# Patient Record
Sex: FEMALE | Race: WHITE | HISPANIC OR LATINO | Employment: PART TIME | URBAN - METROPOLITAN AREA
[De-identification: names, ages, dates, MRNs, and addresses within clinical notes are randomized per-mention and may not be internally consistent; named-entity substitution may affect disease eponyms.]

---
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
## 2021-01-03 ENCOUNTER — APPOINTMENT (EMERGENCY)
Dept: RADIOLOGY | Facility: HOSPITAL | Age: 21
End: 2021-01-03
Payer: COMMERCIAL

## 2021-01-03 ENCOUNTER — HOSPITAL ENCOUNTER (EMERGENCY)
Facility: HOSPITAL | Age: 21
Discharge: HOME | End: 2021-01-03
Attending: EMERGENCY MEDICINE
Payer: COMMERCIAL

## 2021-01-03 VITALS
WEIGHT: 160 LBS | HEIGHT: 63 IN | BODY MASS INDEX: 28.35 KG/M2 | OXYGEN SATURATION: 99 % | HEART RATE: 107 BPM | TEMPERATURE: 102.9 F | SYSTOLIC BLOOD PRESSURE: 119 MMHG | RESPIRATION RATE: 20 BRPM | DIASTOLIC BLOOD PRESSURE: 69 MMHG

## 2021-01-03 DIAGNOSIS — R05.9 COUGH: ICD-10-CM

## 2021-01-03 DIAGNOSIS — R07.9 CHEST PAIN: Primary | ICD-10-CM

## 2021-01-03 LAB
ATRIAL RATE: 99 BPM
P AXIS: 33 DEGREES
PR INTERVAL: 132 MS
QRS AXIS: 99 DEGREES
QRSD INTERVAL: 88 MS
QT INTERVAL: 314 MS
QTC INTERVAL: 402 MS
T WAVE AXIS: 25 DEGREES
VENTRICULAR RATE: 99 BPM

## 2021-01-03 PROCEDURE — 71045 X-RAY EXAM CHEST 1 VIEW: CPT

## 2021-01-03 PROCEDURE — 93010 ELECTROCARDIOGRAM REPORT: CPT | Performed by: INTERNAL MEDICINE

## 2021-01-03 PROCEDURE — 99284 EMERGENCY DEPT VISIT MOD MDM: CPT

## 2021-01-03 PROCEDURE — 99285 EMERGENCY DEPT VISIT HI MDM: CPT | Performed by: EMERGENCY MEDICINE

## 2021-01-03 PROCEDURE — 96360 HYDRATION IV INFUSION INIT: CPT

## 2021-01-03 PROCEDURE — 93005 ELECTROCARDIOGRAM TRACING: CPT

## 2021-01-03 RX ADMIN — SODIUM CHLORIDE 1000 ML: 0.9 INJECTION, SOLUTION INTRAVENOUS at 06:43

## 2021-01-03 NOTE — ED PROVIDER NOTES
History  Chief Complaint   Patient presents with    Cough     per ems - patient dx with covid approx 1 week ago, c/o chest pain with coughing     18-year-old woman presenting with chest pain that occurs only when she coughs  She states that the symptoms of 3 days ago  Today someone and also bought a home pulse ox and it measured 89%  She called a nursing hotline in the instructor comes into the emergency department  She was diagnosed with COVID some time in the past 2 weeks, her symptoms 1st started on December 24th  They have been stable if not improving since then  The chest pain is sharp in nature, only occurs when she coughs  It does not radiate  She has nausea and vomiting, but these symptoms do not necessarily correlate to the chest pain  No lightheadedness  No family history of cardiac disease  No history of blood clot  Patient does not take any oral contraceptive pills  No hemoptysis, recent surgeries, trips, calf pain, calf swelling  Prior to Admission Medications   Prescriptions Last Dose Informant Patient Reported? Taking? cetirizine (ZyrTEC) 10 mg tablet   No No   Sig: Take 1 tablet (10 mg total) by mouth daily      Facility-Administered Medications: None       Past Medical History:   Diagnosis Date    Allergic rhinitis        History reviewed  No pertinent surgical history  History reviewed  No pertinent family history  I have reviewed and agree with the history as documented  E-Cigarette/Vaping     E-Cigarette/Vaping Substances     Social History     Tobacco Use    Smoking status: Never Smoker    Smokeless tobacco: Never Used   Substance Use Topics    Alcohol use: Not on file    Drug use: Not on file        Review of Systems   Constitutional: Positive for chills, fatigue and fever  Respiratory: Positive for cough  Negative for shortness of breath  Cardiovascular: Positive for chest pain  Negative for palpitations and leg swelling     Gastrointestinal: Positive for nausea and vomiting  Negative for abdominal pain and diarrhea  Genitourinary: Negative for decreased urine volume  Musculoskeletal: Negative for back pain  All other systems reviewed and are negative  Physical Exam  ED Triage Vitals [01/03/21 0550]   Temperature Pulse Respirations Blood Pressure SpO2   (!) 102 9 °F (39 4 °C) (!) 107 20 119/69 99 %      Temp Source Heart Rate Source Patient Position - Orthostatic VS BP Location FiO2 (%)   Oral Monitor -- -- --      Pain Score       4             Orthostatic Vital Signs  Vitals:    01/03/21 0550   BP: 119/69   Pulse: (!) 107       Physical Exam  Vitals signs reviewed  Constitutional:       Appearance: She is not ill-appearing  HENT:      Head: Normocephalic and atraumatic  Nose: Nose normal    Eyes:      General: No scleral icterus  Cardiovascular:      Rate and Rhythm: Regular rhythm  Tachycardia present  Pulses: Normal pulses  Radial pulses are 2+ on the right side and 2+ on the left side  Heart sounds: Normal heart sounds  No murmur  Pulmonary:      Effort: Pulmonary effort is normal       Breath sounds: Normal breath sounds  Abdominal:      General: There is no distension  Palpations: Abdomen is soft  Tenderness: There is no abdominal tenderness  Musculoskeletal:      Right lower leg: No edema  Left lower leg: No edema  Comments: No calf tenderness on exam     Skin:     General: Skin is warm and dry  Capillary Refill: Capillary refill takes less than 2 seconds  Coloration: Skin is not pale  Neurological:      General: No focal deficit present  Mental Status: She is alert and oriented to person, place, and time  Mental status is at baseline           ED Medications  Medications   sodium chloride 0 9 % bolus 1,000 mL (1,000 mL Intravenous New Bag 1/3/21 5401)       Diagnostic Studies  Results Reviewed     Procedure Component Value Units Date/Time    POCT pregnancy, urine [[id number]] Lab Status: No result                  X-ray chest 1 view portable    (Results Pending)         Procedures  ECG 12 Lead Documentation Only    Date/Time: 1/3/2021 6:51 AM  Performed by: Cain Mary MD  Authorized by: Cain Mary MD     ECG reviewed by me, the ED Provider: yes    Patient location:  ED  Previous ECG:     Previous ECG:  Unavailable  Interpretation:     Interpretation: normal    Rate:     ECG rate:  99    ECG rate assessment: normal    Rhythm:     Rhythm: sinus rhythm    Ectopy:     Ectopy: none    QRS:     QRS axis:  Normal  Conduction:     Conduction: normal    ST segments:     ST segments:  Normal  T waves:     T waves: normal            ED Course                                       MDM  Number of Diagnoses or Management Options  Chest pain:   Cough:   Diagnosis management comments: 23 yo female presenting with pleuritic chest pain, most likely related to her PNA  Will assess for cardiac etiology with CXR, ECG  Will give 1L fluid bolus given tachycardia  ECG normal  Tachycardia improved with fluid bolus  Patient signed out to a m  resident  Disposition  Final diagnoses:   Chest pain   Cough     Time reflects when diagnosis was documented in both MDM as applicable and the Disposition within this note     Time User Action Codes Description Comment    1/3/2021  6:54 AM Nathan Sabattus Add [R07 9] Chest pain     1/3/2021  6:54 AM Nathan Kasia Add [J18 9] Pneumonia     1/3/2021  7:29 AM B[employer]ark [employer], [employer] Add [U07 1] COVID-19     1/3/2021  7:30 AM Alexx Matters Remove [U07 1] COVID-19     1/3/2021  7:34 AM Alexx Matters Remove [J18 9] Pneumonia     1/3/2021  7:34 AM Alexx Matters Add [R05] Cough       ED Disposition     ED Disposition Condition Date/Time Comment    Discharge Stable Sun Ata 3, 2021  7:35 AM Candi Yañez discharge to home/self care              Follow-up Information     Follow up With Specialties Details Why Contact Info Additional Mary Vega MD Pediatric Nephrology Schedule an appointment as soon as possible for a visit in 2 days to discuss your ED visit  [address]  [address] [place] [address] Emergency Department Emergency Medicine  If symptoms worsen [address]  [phone]  ED, [address] [place] [address], [address] [phone]          Patient's Medications   Discharge Prescriptions    No medications on file     No discharge procedures on file  PDMP Review     None           ED Provider  Attending physically available and evaluated Titussameer Alex  I managed the patient along with the ED Attending      Electronically Signed by         Coolidge Ganser, MD  01/03/21 6210

## 2021-01-03 NOTE — ED ATTENDING ATTESTATION
1/3/2021  IYobani MD, saw and evaluated the patient  I have discussed the patient with the resident/non-physician practitioner and agree with the resident's/non-physician practitioner's findings, Plan of Care, and MDM as documented in the resident's/non-physician practitioner's note, except where noted  All available labs and Radiology studies were reviewed  I was present for key portions of any procedure(s) performed by the resident/non-physician practitioner and I was immediately available to provide assistance  At this point I agree with the current assessment done in the Emergency Department  I have conducted an independent evaluation of this patient a history and physical is as follows:    76-year-old woman diagnosed with COVID 1 week ago presenting with continued cough, has chest pain when she coughs but not when she is not coughing  No shortness of breath  Patient became concerned because she got a home pulse oximeter which was measuring 89%  She does have acrylic nails  The emergency department she is awake and alert no acute distress  She is febrile  Heart mildly tachycardic, regular with no murmurs rubs or gallops  Lungs are clear to auscultation bilaterally with no respiratory distress  O2 sat here is normal on room air  Abdomen soft, nontender, nondistended  No leg swelling or edema  EKG does not show any significant acute abnormality  Will get chest x-ray, if this is normal will discharge with instructions for symptomatic care      ED Course         Critical Care Time  Procedures

## 2021-01-03 NOTE — DISCHARGE INSTRUCTIONS
You were seen in the emergency department today for chest pain  We believe that this is most likely related to the cough from your COVID infection from 2 weeks ago  You can continue to treat your pain with Tylenol and ibuprofen  If you have any new or worsening symptoms, please return to your nearest emergency department

## 2023-05-19 ENCOUNTER — OFFICE VISIT (OUTPATIENT)
Dept: INTERNAL MEDICINE CLINIC | Facility: CLINIC | Age: 23
End: 2023-05-19

## 2023-05-19 ENCOUNTER — APPOINTMENT (OUTPATIENT)
Dept: LAB | Facility: CLINIC | Age: 23
End: 2023-05-19

## 2023-05-19 VITALS
DIASTOLIC BLOOD PRESSURE: 70 MMHG | TEMPERATURE: 98.9 F | HEART RATE: 74 BPM | HEIGHT: 63 IN | SYSTOLIC BLOOD PRESSURE: 90 MMHG | BODY MASS INDEX: 25.16 KG/M2 | WEIGHT: 142 LBS | OXYGEN SATURATION: 98 %

## 2023-05-19 DIAGNOSIS — Z11.4 SCREENING FOR HIV (HUMAN IMMUNODEFICIENCY VIRUS): ICD-10-CM

## 2023-05-19 DIAGNOSIS — Z76.89 ESTABLISHING CARE WITH NEW DOCTOR, ENCOUNTER FOR: Primary | ICD-10-CM

## 2023-05-19 DIAGNOSIS — Z11.59 NEED FOR HEPATITIS C SCREENING TEST: ICD-10-CM

## 2023-05-19 DIAGNOSIS — K76.89 FOCAL NODULAR HYPERPLASIA OF LIVER: ICD-10-CM

## 2023-05-19 LAB
HCV AB SER QL: NORMAL
HIV 1+2 AB+HIV1 P24 AG SERPL QL IA: NORMAL
HIV 2 AB SERPL QL IA: NORMAL
HIV1 AB SERPL QL IA: NORMAL
HIV1 P24 AG SERPL QL IA: NORMAL

## 2023-05-19 NOTE — PROGRESS NOTES
Assessment/Plan:    Diagnoses and all orders for this visit:    Establishing care with new doctor, encounter for    Screening for HIV (human immunodeficiency virus)  -     HIV 1/2 AG/AB w Reflex [facility] for 2 yr old and above; Future    Need for hepatitis C screening test  -     Hepatitis C Antibody; Future    Focal nodular hyperplasia of liver  -     Ambulatory Referral to Gastroenterology; Future         Patient had an incidental finding of a liver mass on CT abdomen done for complaints of diarrhea with blood few days ago  Symptoms of abdominal pain and diarrhea have completely resolved  CT abdomen and pelvis without contrast elevate right posterior hepatic lobe a lobulated mass measuring 6 into 6 7 cm, possible focal nodular hyperplasia  Denies any subjective complaints, alcohol or recreational drug use, or history of any OCP use    Will refer to gastroenterology for further work-up as indicated         There are no Patient Instructions on file for this visit  Subjective:      Patient ID: Ginger Shepherd is a 25 y o  female    Patient comes to establish care and follow-up of his ct imaging finding with incidental findings        Current Outpatient Medications:   •  cetirizine (ZyrTEC) 10 mg tablet, Take 1 tablet (10 mg total) by mouth daily, Disp: 30 tablet, Rfl: 2     Past Medical History:   Diagnosis Date   • Allergic rhinitis          History reviewed  No pertinent surgical history  No Known Allergies    No results found for this or any previous visit (from the past 1008 hour(s))  The following portions of the patient's history were reviewed and updated as appropriate: allergies, current medications, past family history, past medical history, past social history, past surgical history and problem list      Review of Systems   Constitutional: Negative for appetite change, chills, diaphoresis, fatigue, fever and unexpected weight change     Respiratory: Negative for apnea, cough, choking, chest tightness, shortness of breath, wheezing and stridor  Cardiovascular: Negative for chest pain, palpitations and leg swelling  Gastrointestinal: Negative for abdominal distention, abdominal pain, anal bleeding, blood in stool, constipation, diarrhea, nausea and vomiting  Genitourinary: Negative for decreased urine volume, difficulty urinating, frequency and urgency  Musculoskeletal: Negative for arthralgias, back pain and myalgias  Neurological: Negative for dizziness, light-headedness, numbness and headaches  Objective:      Vitals:    05/19/23 1014   BP: 90/70   Pulse: 74   Temp: 98 9 °F (37 2 °C)   SpO2: 98%          Physical Exam  Vitals reviewed  Constitutional:       General: She is not in acute distress  Appearance: Normal appearance  She is not ill-appearing, toxic-appearing or diaphoretic  HENT:      Mouth/Throat:      Mouth: Mucous membranes are moist    Cardiovascular:      Rate and Rhythm: Normal rate and regular rhythm  Pulses: Normal pulses  Heart sounds: Normal heart sounds  No murmur heard  No friction rub  No gallop  Pulmonary:      Effort: Pulmonary effort is normal  No respiratory distress  Breath sounds: Normal breath sounds  No stridor  No wheezing, rhonchi or rales  Chest:      Chest wall: No tenderness  Musculoskeletal:      Right lower leg: No edema  Left lower leg: No edema  Skin:     General: Skin is warm and dry  Findings: No lesion or rash  Neurological:      Mental Status: She is alert

## 2023-06-23 ENCOUNTER — OFFICE VISIT (OUTPATIENT)
Dept: INTERNAL MEDICINE CLINIC | Facility: CLINIC | Age: 23
End: 2023-06-23
Payer: COMMERCIAL

## 2023-06-23 VITALS
WEIGHT: 148 LBS | OXYGEN SATURATION: 98 % | HEART RATE: 52 BPM | SYSTOLIC BLOOD PRESSURE: 104 MMHG | TEMPERATURE: 97.6 F | DIASTOLIC BLOOD PRESSURE: 70 MMHG | HEIGHT: 63 IN | BODY MASS INDEX: 26.22 KG/M2

## 2023-06-23 DIAGNOSIS — Z12.4 ENCOUNTER FOR SCREENING FOR CERVICAL CANCER: Primary | ICD-10-CM

## 2023-06-23 DIAGNOSIS — Z23 NEED FOR TDAP VACCINATION: ICD-10-CM

## 2023-06-23 DIAGNOSIS — Z11.59 NEED FOR HEPATITIS B SCREENING TEST: ICD-10-CM

## 2023-06-23 DIAGNOSIS — L23.7 POISON IVY: ICD-10-CM

## 2023-06-23 DIAGNOSIS — Z11.3 SCREENING FOR STDS (SEXUALLY TRANSMITTED DISEASES): ICD-10-CM

## 2023-06-23 DIAGNOSIS — Z00.00 ANNUAL PHYSICAL EXAM: ICD-10-CM

## 2023-06-23 DIAGNOSIS — Z02.0 ENCOUNTER FOR PRE-SCHOOL HEALTH EXAMINATION: ICD-10-CM

## 2023-06-23 PROCEDURE — 90471 IMMUNIZATION ADMIN: CPT

## 2023-06-23 PROCEDURE — 90715 TDAP VACCINE 7 YRS/> IM: CPT

## 2023-06-23 PROCEDURE — 99395 PREV VISIT EST AGE 18-39: CPT | Performed by: INTERNAL MEDICINE

## 2023-06-23 RX ORDER — TRIAMCINOLONE ACETONIDE 1 MG/G
CREAM TOPICAL 2 TIMES DAILY
Qty: 80 G | Refills: 0 | Status: SHIPPED | OUTPATIENT
Start: 2023-06-23

## 2023-06-23 NOTE — PROGRESS NOTES
ADULT ANNUAL [address] [facility] INTERNAL MEDICINE [facility]    NAME: Johan Maxwell  AGE: 25 y o  SEX: female  : 2000     DATE: 2023     Assessment and Plan:     Problem List Items Addressed This Visit    None  Visit Diagnoses     Encounter for screening for cervical cancer    -  Primary    Relevant Orders    Ambulatory Referral to Gynecology    Screening for STDs (sexually transmitted diseases)        Relevant Orders    Chlamydia/GC amplified DNA by PCR    Annual physical exam        Poison ivy        Relevant Medications    triamcinolone (KENALOG) 0 1 % cream    Need for Tdap vaccination        Relevant Orders    TDAP VACCINE GREATER THAN OR EQUAL TO 6YO IM (Completed)    Need for hepatitis B screening test        Encounter for pre-school health examination        Relevant Orders    Antibody titer          Immunizations and preventive care screenings were discussed with patient today  Appropriate education was printed on patient's after visit summary  Counseling:  · Exercise: the importance of regular exercise/physical activity was discussed  Recommend exercise 3-5 times per week for at least 30 minutes  BMI Counseling: Body mass index is 26 22 kg/m²  The BMI is above normal  Nutrition recommendations include decreasing portion sizes, encouraging healthy choices of fruits and vegetables, decreasing fast food intake, consuming healthier snacks, limiting drinks that contain sugar, moderation in carbohydrate intake, increasing intake of lean protein, reducing intake of saturated and trans fat and reducing intake of cholesterol  Exercise recommendations include moderate physical activity 150 minutes/week  Rationale for BMI follow-up plan is due to patient being overweight or obese  Return in about 1 year (around 2024) for Next scheduled follow up       Chief Complaint:     Chief Complaint   Patient presents with   • Physical Exam Pt here for physical   Pt needs Hep B surface antibody quantitative titer   Pt may be due for TDAP today    • HM     Pt due for:  Physical -- today  BMI adult  Cerv Can - order placed   • Immunizations     Tdap for school      History of Present Illness:     Adult Annual Physical   Patient here for a comprehensive physical exam  The patient reports no problems  Diet and Physical Activity  · Diet/Nutrition: well balanced diet  · Exercise: moderate cardiovascular exercise  Depression Screening  PHQ-2/9 Depression Screening         General Health  · Sleep: sleeps well  · Hearing: normal - bilateral   · Vision: wears contacts  · Dental: regular dental visits  /GYN Health  · Last menstrual period:   · Contraceptive method: barrier methods  · History of STDs?: no      Review of Systems:     Review of Systems   Constitutional: Negative for appetite change, chills, diaphoresis, fatigue, fever and unexpected weight change  Respiratory: Negative for apnea, cough, choking, chest tightness, shortness of breath, wheezing and stridor  Cardiovascular: Negative for chest pain, palpitations and leg swelling  Gastrointestinal: Negative for abdominal distention, abdominal pain, anal bleeding, blood in stool, constipation, diarrhea, nausea and vomiting  Genitourinary: Negative for decreased urine volume, difficulty urinating, frequency and urgency  Musculoskeletal: Negative for arthralgias, back pain and myalgias  Neurological: Negative for dizziness, light-headedness, numbness and headaches  Past Medical History:     Past Medical History:   Diagnosis Date   • Allergic rhinitis       Past Surgical History:     History reviewed  No pertinent surgical history     Social History:     Social History     Socioeconomic History   • Marital status: Single     Spouse name: None   • Number of children: None   • Years of education: None   • Highest education level: None   Occupational History   • None "  Tobacco Use   • Smoking status: Never   • Smokeless tobacco: Never   Vaping Use   • Vaping Use: Never used   Substance and Sexual Activity   • Alcohol use: None   • Drug use: None   • Sexual activity: Yes     Partners: Male     Birth control/protection: Condom Male   Other Topics Concern   • None   Social History Narrative   • None     Social Determinants of Health     Financial Resource Strain: Not on file   Food Insecurity: Not on file   Transportation Needs: Not on file   Physical Activity: Not on file   Stress: Not on file   Social Connections: Not on file   Intimate Partner Violence: Not on file   Housing Stability: Not on file      Family History:     Family History   Problem Relation Age of Onset   • Diabetes Maternal Grandmother       Current Medications:     Current Outpatient Medications   Medication Sig Dispense Refill   • triamcinolone (KENALOG) 0 1 % cream Apply topically 2 (two) times a day 80 g 0   • cetirizine (ZyrTEC) 10 mg tablet Take 1 tablet (10 mg total) by mouth daily (Patient not taking: Reported on 6/23/2023) 30 tablet 2     No current facility-administered medications for this visit  Allergies:     No Known Allergies   Physical Exam:     /70 (BP Location: Left arm, Patient Position: Sitting, Cuff Size: Standard)   Pulse (!) 52   Temp 97 6 °F (36 4 °C) (Temporal)   Ht 5' 3\" (1 6 m)   Wt 67 1 kg (148 lb)   SpO2 98% Comment: room air  BMI 26 22 kg/m²     Physical Exam  Constitutional:       General: She is not in acute distress  Appearance: Normal appearance  She is normal weight  She is not ill-appearing, toxic-appearing or diaphoretic  Cardiovascular:      Rate and Rhythm: Normal rate and regular rhythm  Pulses: Normal pulses  Heart sounds: Normal heart sounds  No murmur heard  No gallop  Pulmonary:      Effort: Pulmonary effort is normal  No respiratory distress  Breath sounds: Normal breath sounds  No stridor  No wheezing, rhonchi or rales   " Chest:      Chest wall: No tenderness  Abdominal:      General: Abdomen is flat  Bowel sounds are normal  There is no distension  Palpations: Abdomen is soft  There is no mass  Tenderness: There is no abdominal tenderness  There is no guarding  Hernia: No hernia is present  Musculoskeletal:         General: Normal range of motion  Skin:     General: Skin is warm and dry  Capillary Refill: Capillary refill takes less than 2 seconds  Neurological:      General: No focal deficit present  Mental Status: She is alert            Gina Chowdhury MD   [address]

## 2023-07-13 ENCOUNTER — CONSULT (OUTPATIENT)
Dept: GASTROENTEROLOGY | Facility: CLINIC | Age: 23
End: 2023-07-13
Payer: COMMERCIAL

## 2023-07-13 VITALS
DIASTOLIC BLOOD PRESSURE: 70 MMHG | WEIGHT: 148.8 LBS | HEIGHT: 63 IN | BODY MASS INDEX: 26.36 KG/M2 | TEMPERATURE: 98.1 F | SYSTOLIC BLOOD PRESSURE: 118 MMHG

## 2023-07-13 DIAGNOSIS — K76.89 FOCAL NODULAR HYPERPLASIA OF LIVER: ICD-10-CM

## 2023-07-13 DIAGNOSIS — R16.0 LIVER MASS, RIGHT LOBE: Primary | ICD-10-CM

## 2023-07-13 PROCEDURE — 99244 OFF/OP CNSLTJ NEW/EST MOD 40: CPT | Performed by: INTERNAL MEDICINE

## 2023-07-13 NOTE — PROGRESS NOTES
[facility]  Outpatient Consultation  Carroll Mustafa 25 y.o. female MRN: [id number]  Encounter: [id number]      ASSESSMENT & PLAN:      Problem List Items Addressed This Visit        Digestive    Suspected focal nodular hyperplasia of liver    Relevant Orders    MRI abdomen w wo contrast       Other    Liver mass, right lobe - Primary     No orders of the defined types were placed in this encounter. 1. Liver mass in right hepatic lobe  Suspected focal nodular hyperplasia of liver but also should rule out hepatocellular adenoma, HCC. LFTs normal at [facility]. CT scan at [facility] on 5/7/23 showed a hypervascular liver mass, "This most likely represents focal nodular hyperplasia; In the posterior right hepatic lobe, there is a lobulated mass with mild uniform arterial phase hyperenhancement measuring 6 cm axial, 6.7 cm craniocaudal. This is mostly isoattenuating and barely perceptible on the unenhanced and portal venous phase images. The remainder of the liver appears normal."   Not using any OCPs and no plans to use OCPs at this time. Patient having regular periods, denies pregnancy. · Plan to obtain MRI of abdomen w and w/o to evaluate liver mass. · If focal nodular hyperplasia, discussed that it would be benign and she would only need sporadic follow up initially to monitor symptoms or growth. · Referral to hepatology fellow's clinic after obtaining MRI. FOLLOW-UP: Hepatology clinic after MRI  ______________________________________________________________________    HPI:  Patient is a 22F with no significant PMH who presents after an abnormal incidental finding of CT scan 5/7/23 with liver mass 6x6.7cm -- found likely to be FNH but requiring further imaging. Patient does not have any RUQ or abdominal pain. Denies OCP use. Denies Fhx of liver nodules or cancers. Denies fevers, chills, night sweats, unintended weight loss.  Discussed with patient that likely represents [address] [place] [address] based on radiologist read, which is benign but should follow up with MRI and hepatology. Of note, patient was at [facility] on 5/7/23 for diarrhea -- likely gastroenteritis which resolved on its own in 7 days. PREVIOUS ENDOSCOPY: none  PREVIOUS COLONOSCOPY: none    REVIEW OF SYSTEMS:    CONSTITUTIONAL: Denies any fever, chills, rigors, and weight loss  HEENT: No earache or tinnitus, denies hearing loss or visual disturbances  CARDIOVASCULAR: No chest pain or palpitations  RESPIRATORY: Denies any cough, hemoptysis, shortness of breath or dyspnea on exertion  GASTROINTESTINAL: As noted in the History of Present Illness  GENITOURINARY: No problems with urination, denies any hematuria or dysuria  NEUROLOGIC: No dizziness or vertigo, denies headaches   MUSCULOSKELETAL: Denies any muscle or joint pain   SKIN: Denies skin rashes or itching  ENDOCRINE: Denies excessive thirst, denies intolerance to heat or cold  PSYCHOSOCIAL: Denies depression or anxiety, denies any recent memory loss     Historical Information   Past Medical History:   Diagnosis Date   • Allergic rhinitis      History reviewed. No pertinent surgical history. Social History   Social History     Substance and Sexual Activity   Alcohol Use Never     Social History     Substance and Sexual Activity   Drug Use Never     Social History     Tobacco Use   Smoking Status Never   Smokeless Tobacco Never     Family History   Problem Relation Age of Onset   • Diabetes Maternal Grandmother        MEDICATIONS & ALLERGIES:    Current Outpatient Medications   Medication Instructions   • cetirizine (ZYRTEC) 10 mg, Oral, Daily   • triamcinolone (KENALOG) 0.1 % cream Topical, 2 times daily     No Known Allergies    PHYSICAL EXAM:      Objective   Blood pressure 118/70, temperature 98.1 °F (36.7 °C), temperature source Tympanic, height 5' 3" (1.6 m), weight 67.5 kg (148 lb 12.8 oz). Body mass index is 26.36 kg/m².     General Appearance:   Alert, cooperative, no distress   HEENT:   Normocephalic, atraumatic, anicteric     Neck:   Supple, symmetrical, trachea midline   Lungs:   Equal chest rise, respirations unlabored    Heart:   Regular rate and rhythm   Abdomen:   Soft, non-tender, non-distended; normal bowel sounds; no masses, no organomegaly    Rectal:   Deferred    Extremities:   No cyanosis, clubbing or edema    Neuro: Moves all 4 extremities    Skin:   No jaundice, rashes, or lesions      LAB RESULTS:     No visits with results within 1 Day(s) from this visit. Latest known visit with results is:   Appointment on 05/19/2023   Component Date Value   • HIV-1 p24 Antigen 05/19/2023 Non-Reactive    • HIV-1 Antibody 05/19/2023 Non-Reactive    • HIV-2 Antibody 05/19/2023 Non-Reactive    • HIV Ag-Ab 5th Gen 05/19/2023 Non-Reactive    • Hepatitis C Ab 05/19/2023 Non-reactive        RADIOLOGY RESULTS: I have personally reviewed pertinent imaging studies.     Rica Rodriguez MD  Internal Medicine Residency, PGY-3  [facility]  Available on [place] Text

## 2023-07-14 ENCOUNTER — APPOINTMENT (OUTPATIENT)
Dept: LAB | Facility: CLINIC | Age: 23
End: 2023-07-14
Payer: COMMERCIAL

## 2023-07-14 DIAGNOSIS — Z01.84 IMMUNITY STATUS TESTING: ICD-10-CM

## 2023-07-14 DIAGNOSIS — Z11.3 SCREENING FOR STDS (SEXUALLY TRANSMITTED DISEASES): ICD-10-CM

## 2023-07-14 DIAGNOSIS — Z02.0 ENCOUNTER FOR PRE-SCHOOL HEALTH EXAMINATION: ICD-10-CM

## 2023-07-14 PROCEDURE — 86706 HEP B SURFACE ANTIBODY: CPT

## 2023-07-14 PROCEDURE — 36415 COLL VENOUS BLD VENIPUNCTURE: CPT

## 2023-07-14 PROCEDURE — 87491 CHLMYD TRACH DNA AMP PROBE: CPT

## 2023-07-14 PROCEDURE — 87591 N.GONORRHOEAE DNA AMP PROB: CPT

## 2023-07-15 LAB — HBV SURFACE AB SER-ACNC: <3 MIU/ML

## 2023-07-17 ENCOUNTER — TELEPHONE (OUTPATIENT)
Dept: INTERNAL MEDICINE CLINIC | Facility: CLINIC | Age: 23
End: 2023-07-17

## 2023-07-17 LAB
C TRACH DNA SPEC QL NAA+PROBE: NEGATIVE
N GONORRHOEA DNA SPEC QL NAA+PROBE: NEGATIVE

## 2023-07-17 NOTE — TELEPHONE ENCOUNTER
----- Message from Wayman Lesches, MD sent at 7/17/2023 12:21 PM EDT -----  Please call and let the patient know that her gonorrhea chlamydia test were negative

## 2023-07-17 NOTE — TELEPHONE ENCOUNTER
----- Message from David Romero MD sent at 7/17/2023 12:21 PM EDT -----  Please call and let the patient know that her gonorrhea chlamydia test were negative

## 2023-07-19 ENCOUNTER — CLINICAL SUPPORT (OUTPATIENT)
Dept: INTERNAL MEDICINE CLINIC | Facility: CLINIC | Age: 23
End: 2023-07-19
Payer: COMMERCIAL

## 2023-07-19 DIAGNOSIS — Z23 ENCOUNTER FOR IMMUNIZATION: Primary | ICD-10-CM

## 2023-07-19 PROCEDURE — 90746 HEPB VACCINE 3 DOSE ADULT IM: CPT

## 2023-07-19 PROCEDURE — 90471 IMMUNIZATION ADMIN: CPT

## 2023-07-21 ENCOUNTER — OFFICE VISIT (OUTPATIENT)
Dept: GASTROENTEROLOGY | Facility: CLINIC | Age: 23
End: 2023-07-21

## 2023-07-21 VITALS
TEMPERATURE: 97.7 F | DIASTOLIC BLOOD PRESSURE: 64 MMHG | WEIGHT: 144 LBS | HEIGHT: 63 IN | BODY MASS INDEX: 25.52 KG/M2 | SYSTOLIC BLOOD PRESSURE: 112 MMHG

## 2023-07-21 DIAGNOSIS — R16.0 MASS OF RIGHT LOBE OF LIVER: ICD-10-CM

## 2023-07-21 DIAGNOSIS — K76.89 FOCAL NODULAR HYPERPLASIA OF LIVER: Primary | ICD-10-CM

## 2023-07-21 NOTE — PROGRESS NOTES
[facility] - Outpatient Follow-up Note  Jeremy Jade 25 y.o. female MRN: [id number]  Encounter: [id number]          ASSESSMENT AND PLAN:      1. Suspected focal nodular hyperplasia of liver  2. Mass of right lobe of liver  Patient incidentally noted to have an approximate 6 cm liver lesion on CT most suspicious for a focal nodular hyperplasia ([address]). History is grossly unremarkable and hepatic function has been historically within normal limits. Denies use of OCPs or other hormonal birth control methods. Patient is scheduled for an MRI for definitive characterization of this lesion. Will ensure MRI is triple phase and with Eovist contrast. Discussed that if MRI findings are consistent with an [address], that this is a benign lesion and she would not require surveillance imaging so long as she remains asymptomatic and is not taking hormonal birth control. If she were to begin hormonal birth control, she is aware to inform a provider as it is recommended she have surveillance imaging with U/S every 6-12 months x2-3 years to ensure size stability. Pending results of her MRI, patient may follow-up in hepatology clinic on an as-needed basis. - MRI abdomen w wo contrast; Future     Follow-up PRN. ______________________________________________________________________    SUBJECTIVE: Patient is a 25 y.o. female with no significant PMH who presents today for follow-up regarding a liver mass. Patient was evaluated at [facility] ED for diarrhea and incidentally noted to have a "lobulated mass with mild uniform arterial phase hyperenhancement measuring 6 cm axial, 6.7 cm craniocaudal" noted to most likely represent a FNH. She has since been seen by gastroenterology and ordered for an MRI for definitive characterization of this lesion which is scheduled on 7/27. Review of limited serologies with hepatic function historically within normal limits.  Denies personal history of liver disease. Denies family history liver disease or liver cancer. Denies use of OCPs or other hormonal birth control and state she uses condoms as her primary mode of birth control. Denies any recent travel outside the country or ingestion of undercooked/poorly prepared foods. Denies known past or current infection with viral hepatitis - Prior testing for HCV negative. Denies pruritus, confusion, dark urine, bruising easily, yellowing of the eyes and/or skin, new/worsening abdominal distension or abdominal pain. REVIEW OF SYSTEMS IS OTHERWISE NEGATIVE. Historical Information   Past Medical History:   Diagnosis Date   • Allergic rhinitis      History reviewed. No pertinent surgical history. Social History   Social History     Substance and Sexual Activity   Alcohol Use Never     Social History     Substance and Sexual Activity   Drug Use Never     Social History     Tobacco Use   Smoking Status Never   Smokeless Tobacco Never     Family History   Problem Relation Age of Onset   • Diabetes Maternal Grandmother        Meds/Allergies       Current Outpatient Medications:   •  triamcinolone (KENALOG) 0.1 % cream  •  cetirizine (ZyrTEC) 10 mg tablet    No Known Allergies        Objective     Blood pressure 112/64, temperature 97.7 °F (36.5 °C), temperature source Tympanic, height 5' 3" (1.6 m), weight 65.3 kg (144 lb). Body mass index is 25.51 kg/m². PHYSICAL EXAM:      General Appearance:   Alert, cooperative, no distress   HEENT:   Normocephalic, atraumatic, anicteric. Neck:  Supple, symmetrical, trachea midline   Lungs:   Clear to auscultation bilaterally; no rales, rhonchi or wheezing; respirations unlabored    Heart[de-identified]   Regular rate and rhythm; no murmur, rub, or gallop.    Abdomen:   Soft, non-tender, non-distended; normal bowel sounds; no masses, no organomegaly    Genitalia:   Deferred    Rectal:   Deferred    Extremities:  No cyanosis, clubbing or edema    Pulses:  2+ and symmetric    Skin: No jaundice, rashes, or lesions    Lymph nodes:  No palpable cervical lymphadenopathy        Lab Results:   No visits with results within 1 Day(s) from this visit. Latest known visit with results is:   Appointment on 07/14/2023   Component Date Value   • Hep B S Ab 07/14/2023 <3.00    • N gonorrhoeae, DNA Probe 07/14/2023 Negative    • Chlamydia trachomatis, D* 07/14/2023 Negative          Radiology Results:   No results found. Julissa Armando PA-C     **Please note:  Dictation voice to text software may have been used in the creation of this record. Occasional wrong word or “sound alike” substitutions may have occurred due to the inherent limitations of voice recognition software. Read the chart carefully and recognize, using context, where substitutions have occurred. **

## 2023-07-27 ENCOUNTER — HOSPITAL ENCOUNTER (OUTPATIENT)
Dept: RADIOLOGY | Facility: HOSPITAL | Age: 23
Discharge: HOME | End: 2023-07-27
Payer: COMMERCIAL

## 2023-07-27 DIAGNOSIS — K76.89 FOCAL NODULAR HYPERPLASIA OF LIVER: ICD-10-CM

## 2023-07-27 PROCEDURE — 74183 MRI ABD W/O CNTR FLWD CNTR: CPT

## 2023-07-27 PROCEDURE — G1004 CDSM NDSC: HCPCS

## 2023-07-27 PROCEDURE — A9581 GADOXETATE DISODIUM INJ: HCPCS

## 2023-07-27 RX ADMIN — GADOXETATE DISODIUM 6 ML: 181.43 INJECTION, SOLUTION INTRAVENOUS at 07:41

## 2023-09-20 ENCOUNTER — CLINICAL SUPPORT (OUTPATIENT)
Dept: INTERNAL MEDICINE CLINIC | Facility: CLINIC | Age: 23
End: 2023-09-20
Payer: COMMERCIAL

## 2023-09-20 DIAGNOSIS — Z23 ENCOUNTER FOR IMMUNIZATION: Primary | ICD-10-CM

## 2023-09-20 PROCEDURE — 90471 IMMUNIZATION ADMIN: CPT

## 2023-09-20 PROCEDURE — 90746 HEPB VACCINE 3 DOSE ADULT IM: CPT

## 2024-01-22 ENCOUNTER — CLINICAL SUPPORT (OUTPATIENT)
Dept: INTERNAL MEDICINE CLINIC | Facility: CLINIC | Age: 24
End: 2024-01-22
Payer: COMMERCIAL

## 2024-01-22 DIAGNOSIS — Z23 ENCOUNTER FOR IMMUNIZATION: Primary | ICD-10-CM

## 2024-01-22 PROCEDURE — 90471 IMMUNIZATION ADMIN: CPT

## 2024-01-22 PROCEDURE — 90746 HEPB VACCINE 3 DOSE ADULT IM: CPT

## 2024-07-05 ENCOUNTER — OFFICE VISIT (OUTPATIENT)
Dept: INTERNAL MEDICINE CLINIC | Facility: CLINIC | Age: 24
End: 2024-07-05
Payer: COMMERCIAL

## 2024-07-05 VITALS
SYSTOLIC BLOOD PRESSURE: 120 MMHG | HEART RATE: 65 BPM | BODY MASS INDEX: 25.69 KG/M2 | WEIGHT: 145 LBS | TEMPERATURE: 99 F | DIASTOLIC BLOOD PRESSURE: 70 MMHG | HEIGHT: 63 IN | OXYGEN SATURATION: 98 %

## 2024-07-05 DIAGNOSIS — Z12.4 SCREENING FOR CERVICAL CANCER: ICD-10-CM

## 2024-07-05 DIAGNOSIS — Z00.00 ANNUAL PHYSICAL EXAM: Primary | ICD-10-CM

## 2024-07-05 DIAGNOSIS — Z23 ENCOUNTER FOR IMMUNIZATION: ICD-10-CM

## 2024-07-05 PROCEDURE — 90746 HEPB VACCINE 3 DOSE ADULT IM: CPT

## 2024-07-05 PROCEDURE — 99395 PREV VISIT EST AGE 18-39: CPT | Performed by: INTERNAL MEDICINE

## 2024-07-05 PROCEDURE — 90471 IMMUNIZATION ADMIN: CPT

## 2024-07-05 NOTE — PROGRESS NOTES
Adult Annual Physical  Name: Ana Rascon      : 2000      MRN: [id number]  Encounter Provider: Otoniel Wakefield MD  Encounter Date: 2024   Encounter department: [facility] INTERNAL MEDICINE [facility]    Assessment & Plan   1. Annual physical exam  -     Ambulatory referral to Obstetrics / Gynecology; Future  2. Screening for cervical cancer  -     Ambulatory referral to Obstetrics / Gynecology; Future  Immunizations and preventive care screenings were discussed with patient today. Appropriate education was printed on patient's after visit summary.    Counseling:  Exercise: the importance of regular exercise/physical activity was discussed. Recommend exercise 3-5 times per week for at least 30 minutes.          History of Present Illness     Adult Annual Physical:  Patient presents for annual physical.     Diet and Physical Activity:  - Diet/Nutrition: well balanced diet.  - Exercise: moderate cardiovascular exercise.    Depression Screening:  - PHQ-2 Score: 0    General Health:  - Sleep: sleeps well.  - Hearing: normal hearing right ear and normal hearing left ear.  - Vision: no vision problems.  - Dental: regular dental visits.    /GYN Health:  - Follows with GYN: no.   - Menopause: premenopausal.   - History of STDs: no  - Contraception: barrier methods.      Advanced Care Planning:  - Has an advanced directive?: no    - Has a durable medical POA?: no    - ACP document given to patient?: no      Review of Systems   Constitutional:  Negative for appetite change, chills, diaphoresis, fatigue, fever and unexpected weight change.   Respiratory:  Negative for apnea, cough, choking, chest tightness, shortness of breath, wheezing and stridor.    Cardiovascular:  Negative for chest pain, palpitations and leg swelling.   Gastrointestinal:  Negative for abdominal distention, abdominal pain, anal bleeding, blood in stool, constipation, diarrhea, nausea and vomiting.   Genitourinary:   "Negative for decreased urine volume, difficulty urinating, frequency and urgency.   Musculoskeletal:  Negative for arthralgias, back pain and myalgias.   Neurological:  Negative for dizziness, light-headedness, numbness and headaches.         Objective     /70 (BP Location: Left arm, Patient Position: Sitting, Cuff Size: Standard)   Pulse 65   Temp 99 °F (37.2 °C) (Temporal)   Ht 5' 3\" (1.6 m)   Wt 65.8 kg (145 lb)   SpO2 98%   BMI 25.69 kg/m²     Physical Exam  Constitutional:       General: She is not in acute distress.     Appearance: Normal appearance. She is normal weight. She is not ill-appearing, toxic-appearing or diaphoretic.   Cardiovascular:      Rate and Rhythm: Normal rate and regular rhythm.      Pulses: Normal pulses.      Heart sounds: Normal heart sounds. No murmur heard.     No gallop.   Pulmonary:      Effort: Pulmonary effort is normal. No respiratory distress.      Breath sounds: Normal breath sounds. No stridor. No wheezing, rhonchi or rales.   Chest:      Chest wall: No tenderness.   Abdominal:      General: There is no distension.      Palpations: Abdomen is soft.      Tenderness: There is no abdominal tenderness. There is no guarding.   Musculoskeletal:      Right lower leg: No edema.      Left lower leg: No edema.   Skin:     General: Skin is warm and dry.      Findings: No rash.   Neurological:      Mental Status: She is alert and oriented to person, place, and time.       Administrative Statements         "

## 2024-07-05 NOTE — ADDENDUM NOTE
Addended by: OMAR STANFORD on: 7/5/2024 04:31 PM     Modules accepted: Orders     Spoke to patient  She will get labs done before her next appointment in October

## 2024-08-01 ENCOUNTER — OFFICE VISIT (OUTPATIENT)
Dept: OBGYN CLINIC | Facility: CLINIC | Age: 24
End: 2024-08-01
Payer: COMMERCIAL

## 2024-08-01 VITALS
BODY MASS INDEX: 26.05 KG/M2 | SYSTOLIC BLOOD PRESSURE: 104 MMHG | WEIGHT: 147 LBS | HEIGHT: 63 IN | DIASTOLIC BLOOD PRESSURE: 60 MMHG

## 2024-08-01 DIAGNOSIS — Z12.4 ENCOUNTER FOR SCREENING FOR MALIGNANT NEOPLASM OF CERVIX: ICD-10-CM

## 2024-08-01 DIAGNOSIS — Z12.4 CERVICAL CANCER SCREENING: ICD-10-CM

## 2024-08-01 DIAGNOSIS — Z12.39 ENCOUNTER FOR SCREENING BREAST EXAMINATION: ICD-10-CM

## 2024-08-01 DIAGNOSIS — Z11.51 SCREENING FOR HPV (HUMAN PAPILLOMAVIRUS): ICD-10-CM

## 2024-08-01 DIAGNOSIS — Z00.00 ANNUAL PHYSICAL EXAM: ICD-10-CM

## 2024-08-01 DIAGNOSIS — Z01.419 ENCOUNTER FOR WELL WOMAN EXAM: Primary | ICD-10-CM

## 2024-08-01 DIAGNOSIS — Z01.419 WELL WOMAN EXAM WITH ROUTINE GYNECOLOGICAL EXAM: ICD-10-CM

## 2024-08-01 DIAGNOSIS — Z12.4 SCREENING FOR CERVICAL CANCER: ICD-10-CM

## 2024-08-01 PROCEDURE — G0145 SCR C/V CYTO,THINLAYER,RESCR: HCPCS | Performed by: PHYSICIAN ASSISTANT

## 2024-08-01 PROCEDURE — 99395 PREV VISIT EST AGE 18-39: CPT | Performed by: PHYSICIAN ASSISTANT

## 2024-08-07 LAB
LAB AP GYN PRIMARY INTERPRETATION: NORMAL
Lab: NORMAL

## 2024-08-07 NOTE — PROGRESS NOTES
Assessment/Plan:      Diagnoses and all orders for this visit:    Encounter for well woman exam    Encounter for screening breast examination    Cervical cancer screening    Screening for HPV (human papillomavirus)  -     Liquid-based pap, screening  -     Molecular Hold Sample    Screening for cervical cancer  -     Ambulatory referral to Obstetrics / Gynecology    Encounter for screening for malignant neoplasm of cervix  -     Liquid-based pap, screening  -     Molecular Hold Sample    Well woman exam with routine gynecological exam  -     Liquid-based pap, screening  -     Molecular Hold Sample    Annual physical exam  -     Ambulatory referral to Obstetrics / Gynecology          Subjective:     Patient ID: Ana Rascon is a 23 y.o. female.    Pt presents for her annual exam today--  She has no complaints  She has reg bleeding  no pelvic pain  Bowel and bladder are regular  No breast concerns today    pap today.    Daily mvi        Review of Systems   Constitutional:  Negative for chills, fever and unexpected weight change.   HENT:  Negative for ear pain and sore throat.    Eyes:  Negative for pain and visual disturbance.   Respiratory:  Negative for cough and shortness of breath.    Cardiovascular:  Negative for chest pain and palpitations.   Gastrointestinal:  Negative for abdominal pain, blood in stool, constipation, diarrhea and vomiting.   Genitourinary: Negative.  Negative for dysuria and hematuria.   Musculoskeletal:  Negative for arthralgias and back pain.   Skin:  Negative for color change and rash.   Neurological:  Negative for seizures and syncope.   All other systems reviewed and are negative.        Objective:     Physical Exam  Vitals and nursing note reviewed.   Constitutional:       Appearance: Normal appearance. She is well-developed.   HENT:      Head: Normocephalic and atraumatic.   Chest:   Breasts:     Right: No inverted nipple, mass, nipple discharge or skin change.      Left: No inverted  nipple, mass, nipple discharge or skin change.   Abdominal:      Palpations: Abdomen is soft.   Genitourinary:     General: Normal vulva.      Exam position: Supine.      Labia:         Right: No rash, tenderness or lesion.         Left: No rash, tenderness or lesion.       Vagina: Normal.      Cervix: No cervical motion tenderness, discharge or friability.      Adnexa:         Right: No mass, tenderness or fullness.          Left: No mass, tenderness or fullness.     Musculoskeletal:      Cervical back: Normal range of motion.   Lymphadenopathy:      Lower Body: No right inguinal adenopathy. No left inguinal adenopathy.   Neurological:      Mental Status: She is alert.

## 2024-08-20 ENCOUNTER — TELEPHONE (OUTPATIENT)
Age: 24
End: 2024-08-20

## 2024-08-20 DIAGNOSIS — Z11.1 SCREENING EXAMINATION FOR PULMONARY TUBERCULOSIS: Primary | ICD-10-CM

## 2024-08-20 NOTE — TELEPHONE ENCOUNTER
Pt calling to see if  can put in order in chart for TB test for school. Please advise pt if this is something she can get done in office. Thank you!

## 2024-08-20 NOTE — TELEPHONE ENCOUNTER
TB skin test can be done in the office, please let the patient know.  If she needs QuantiFERON gold that is a blood work she needs to get done at the lab

## 2024-08-21 ENCOUNTER — APPOINTMENT (OUTPATIENT)
Dept: LAB | Facility: CLINIC | Age: 24
End: 2024-08-21
Payer: COMMERCIAL

## 2024-08-21 DIAGNOSIS — Z11.1 SCREENING EXAMINATION FOR PULMONARY TUBERCULOSIS: ICD-10-CM

## 2024-08-21 PROCEDURE — 86480 TB TEST CELL IMMUN MEASURE: CPT

## 2024-08-21 PROCEDURE — 36415 COLL VENOUS BLD VENIPUNCTURE: CPT

## 2024-08-22 LAB
GAMMA INTERFERON BACKGROUND BLD IA-ACNC: 0.04 IU/ML
M TB IFN-G BLD-IMP: NEGATIVE
M TB IFN-G CD4+ BCKGRND COR BLD-ACNC: 0.01 IU/ML
M TB IFN-G CD4+ BCKGRND COR BLD-ACNC: 0.04 IU/ML
MITOGEN IGNF BCKGRD COR BLD-ACNC: 9.96 IU/ML

## 2024-08-26 ENCOUNTER — TELEPHONE (OUTPATIENT)
Dept: INTERNAL MEDICINE CLINIC | Facility: CLINIC | Age: 24
End: 2024-08-26

## 2024-08-26 NOTE — TELEPHONE ENCOUNTER
----- Message from Otoniel Wakefield MD sent at 8/26/2024 11:03 AM EDT -----  Please let the patient know that QuantiFERON gold TB screening test was negative.  Please check if any paperwork needs to be completed

## 2024-08-27 NOTE — TELEPHONE ENCOUNTER
Pt calling requesting Quantiferon titer lab work for Hep B for school.      Please review.  Thank you

## 2024-09-06 ENCOUNTER — TELEPHONE (OUTPATIENT)
Age: 24
End: 2024-09-06

## 2024-09-06 DIAGNOSIS — Z01.84 IMMUNITY STATUS TESTING: Primary | ICD-10-CM

## 2024-09-10 ENCOUNTER — APPOINTMENT (OUTPATIENT)
Dept: LAB | Facility: CLINIC | Age: 24
End: 2024-09-10
Payer: COMMERCIAL

## 2024-09-10 LAB — HBV SURFACE AB SER-ACNC: >500 MIU/ML

## 2024-09-10 PROCEDURE — 86706 HEP B SURFACE ANTIBODY: CPT

## 2024-09-10 PROCEDURE — 36415 COLL VENOUS BLD VENIPUNCTURE: CPT

## 2025-08-21 ENCOUNTER — ANNUAL EXAM (OUTPATIENT)
Dept: OBGYN CLINIC | Facility: CLINIC | Age: 25
End: 2025-08-21
Payer: COMMERCIAL

## 2025-08-21 VITALS
HEIGHT: 63 IN | SYSTOLIC BLOOD PRESSURE: 102 MMHG | BODY MASS INDEX: 26.75 KG/M2 | WEIGHT: 151 LBS | DIASTOLIC BLOOD PRESSURE: 64 MMHG

## 2025-08-21 DIAGNOSIS — Z01.419 WELL WOMAN EXAM WITH ROUTINE GYNECOLOGICAL EXAM: Primary | ICD-10-CM

## 2025-08-21 DIAGNOSIS — Z11.3 SCREENING EXAMINATION FOR STD (SEXUALLY TRANSMITTED DISEASE): ICD-10-CM

## 2025-08-21 PROCEDURE — 99395 PREV VISIT EST AGE 18-39: CPT

## 2025-08-22 LAB
C TRACH DNA SPEC QL NAA+PROBE: NEGATIVE
N GONORRHOEA DNA SPEC QL NAA+PROBE: NEGATIVE